# Patient Record
Sex: MALE | Race: OTHER | HISPANIC OR LATINO | URBAN - METROPOLITAN AREA
[De-identification: names, ages, dates, MRNs, and addresses within clinical notes are randomized per-mention and may not be internally consistent; named-entity substitution may affect disease eponyms.]

---

## 2022-12-18 ENCOUNTER — EMERGENCY (EMERGENCY)
Facility: HOSPITAL | Age: 37
LOS: 1 days | Discharge: HOME | End: 2022-12-18
Attending: EMERGENCY MEDICINE | Admitting: EMERGENCY MEDICINE
Payer: SELF-PAY

## 2022-12-18 VITALS
HEART RATE: 83 BPM | RESPIRATION RATE: 20 BRPM | HEIGHT: 68 IN | TEMPERATURE: 98 F | DIASTOLIC BLOOD PRESSURE: 80 MMHG | WEIGHT: 169.98 LBS | OXYGEN SATURATION: 98 % | SYSTOLIC BLOOD PRESSURE: 143 MMHG

## 2022-12-18 PROCEDURE — 65220 REMOVE FOREIGN BODY FROM EYE: CPT | Mod: LT

## 2022-12-18 PROCEDURE — 90715 TDAP VACCINE 7 YRS/> IM: CPT

## 2022-12-18 PROCEDURE — 99284 EMERGENCY DEPT VISIT MOD MDM: CPT

## 2022-12-18 PROCEDURE — 90471 IMMUNIZATION ADMIN: CPT

## 2022-12-18 PROCEDURE — 99284 EMERGENCY DEPT VISIT MOD MDM: CPT | Mod: 25

## 2022-12-18 RX ORDER — IBUPROFEN 200 MG
600 TABLET ORAL ONCE
Refills: 0 | Status: COMPLETED | OUTPATIENT
Start: 2022-12-18 | End: 2022-12-18

## 2022-12-18 RX ORDER — ERYTHROMYCIN BASE 5 MG/GRAM
1 OINTMENT (GRAM) OPHTHALMIC (EYE) ONCE
Refills: 0 | Status: COMPLETED | OUTPATIENT
Start: 2022-12-18 | End: 2022-12-18

## 2022-12-18 RX ORDER — TETANUS TOXOID, REDUCED DIPHTHERIA TOXOID AND ACELLULAR PERTUSSIS VACCINE, ADSORBED 5; 2.5; 8; 8; 2.5 [IU]/.5ML; [IU]/.5ML; UG/.5ML; UG/.5ML; UG/.5ML
0.5 SUSPENSION INTRAMUSCULAR ONCE
Refills: 0 | Status: COMPLETED | OUTPATIENT
Start: 2022-12-18 | End: 2022-12-18

## 2022-12-18 RX ORDER — OFLOXACIN 0.3 %
1 DROPS OPHTHALMIC (EYE) ONCE
Refills: 0 | Status: COMPLETED | OUTPATIENT
Start: 2022-12-18 | End: 2022-12-18

## 2022-12-18 RX ADMIN — Medication 1 APPLICATION(S): at 20:01

## 2022-12-18 RX ADMIN — Medication 600 MILLIGRAM(S): at 19:40

## 2022-12-18 RX ADMIN — TETANUS TOXOID, REDUCED DIPHTHERIA TOXOID AND ACELLULAR PERTUSSIS VACCINE, ADSORBED 0.5 MILLILITER(S): 5; 2.5; 8; 8; 2.5 SUSPENSION INTRAMUSCULAR at 19:40

## 2022-12-18 RX ADMIN — Medication 1 DROP(S): at 20:02

## 2022-12-18 NOTE — ED ADULT NURSE NOTE - OBJECTIVE STATEMENT
37y male w/no pmh presents to ED w/ left eye pain.  [id number] assisted in translation. Pt states he was cleaning metal earlier today and felt something go into his left eye. Pt's left eye is red and slightly swollen with no easily visible foreign body noted. Pt states he has blurry vision. Denies headaches, n/v.

## 2022-12-18 NOTE — ED PROVIDER NOTE - PROGRESS NOTE DETAILS
Ophthal in eye room with pt. Pt's evaluated by ophthal and recommended ofloxacin QID, erythromycin ointment at night, and f/u with clinic on Tuesday.

## 2022-12-18 NOTE — ED PROVIDER NOTE - NSFOLLOWUPCLINICS_GEN_ALL_ED_FT
[facility] Ophthalmology  Ophthalmology  [address] [place] [address], [facility] [address]  [place] [address]  Phone: [phone]  Fax:

## 2022-12-18 NOTE — ED PROVIDER NOTE - NS ED ATTENDING STATEMENT MOD
This was a shared visit with the CHELITA. I reviewed and verified the documentation and independently performed the documented:

## 2022-12-18 NOTE — ED PROVIDER NOTE - PATIENT PORTAL LINK FT
You can access the FollowMyHealth Patient Portal offered by  by registering at the following website: [email or web address]. By joining [facility]’s FollowMyHealth portal, you will also be able to view your health information using other applications (apps) compatible with our system.

## 2022-12-18 NOTE — CONSULT NOTE ADULT - ASSESSMENT
Assessment and Recommendations:  37y male with no past medical history/ocular history consulted for FB sensation in left eye, found to have metallic foreign body and rust ring in the left eye.     #Corneal foreign body and abrasion of L eye  - Metallic foreign body at 9 o'clock removed with 30g needle (after obtaining consent), with 2mm x 1mm corneal abrasion remaining   - Eyelid flipped and fornices swept; no additional FB seen  - DFE with flat macula and periphery, no retinal tear, hole, or detachment noted  - recommend antibiotic eye drop (ofloxacin) four times a day to L eye  - recommend erythromycin ophthalmic ointment nightly to L eye  - Will arrange outpatient ophtho followup      Outpatient Follow-up: Patient should follow-up with his/her ophthalmologist or with [facility] Department of Ophthalmology within 1 week of after discharge at:    [address]  [place] [address]  [phone]    Danuta Miramontes MD, PGY-2  Contact: [employer] Teams     Assessment and Recommendations:  37y male with no past medical history/ocular history consulted for FB sensation in left eye, found to have metallic foreign body and rust ring in the left eye.     #Corneal foreign body and abrasion of L eye  - Metallic foreign body at 9 o'clock removed with 30g needle (after obtaining consent), with 2mm x 1mm corneal abrasion remaining   - Eyelid flipped and fornices swept; no additional FB seen  - DFE with flat macula and periphery, no retinal tear, hole, or detachment noted  - recommend antibiotic eye drop (ofloxacin) four times a day to L eye  - recommend erythromycin ophthalmic ointment nightly to L eye  - Will arrange outpatient ophtho followup      Outpatient Follow-up: Patient should follow-up with his/her ophthalmologist or with [facility] Department of Ophthalmology within 1 week of after discharge at:    [address]  [place] [address]  [phone]    Danuta Miramontes MD, PGY-2  Contact: [employer] Teams     Assessment and Recommendations:  37y male with no past medical history/ocular history consulted for FB sensation in left eye, found to have metallic foreign body and rust ring in the left eye.     #Corneal foreign body and abrasion of L eye  - Metallic foreign body at 9 o'clock removed with 30g needle (after obtaining consent), with 2mm x 1mm corneal abrasion remaining   - Eyelid flipped and fornices swept; no additional FB seen  - DFE with flat macula and periphery, no retinal tear, hole, or detachment noted  - recommend antibiotic eye drop (ofloxacin) four times a day to L eye  - recommend erythromycin ophthalmic ointment nightly to L eye  - Will arrange outpatient ophtho followup      Outpatient Follow-up: Patient should follow-up with his/her ophthalmologist or with [facility] Department of Ophthalmology within 1 week of after discharge at:    [address]  [place] [address]  [phone]    Danuta Miramontes MD, PGY-2  Contact: [employer] Teams

## 2022-12-18 NOTE — CONSULT NOTE ADULT - SUBJECTIVE AND OBJECTIVE BOX
[facility] DEPARTMENT OF OPHTHALMOLOGY - INITIAL ADULT CONSULT  -----------------------------------------------------------------------------  Danuta Miramontes MD, PGY-2  Contact: TEAMS  -----------------------------------------------------------------------------    HPI:  37y M who primarily speaks Portuguese ( ID: [id number]) w/ no pertinent pmhx presents to the ED c/o L eye injury, foreign body sensation x2days and blurry vision starting today. Denies n/v. Reports that he was cleaning metal and felt something go into his L eye. States that he did not go to any doctors after the incident. Denies OTC pain medication use. NKDA. Unsure of last TDAP. Denies wearing contact lenses. Denies headache or N/V. Denies fever, chills, cough or recent sickness.    Interval History: Patient states he got a piece of metal in his left eye on 12/16. He experienced some pain, but thought the metal would fall out and the pain would improve. He presented to [facility] ED on 12/18 because of increasing pain and redness in the left eye. The patient denies any flashes floaters, curtain. Denies any other trauma to the head or eye. States he has never had an eye exam before.     PMH: None   POcHx: denies surg/laser  FH: denies glc/amd  Social History: denies etoh/tobacco  Ophthalmic Medications: none  Allergies: NKDA    Review of Systems:  Constitutional: No fever, chills  Eyes: No blurry vision, flashes, floaters, double vision OU. +FBS, erythema, tearing OS  Neuro: No tremors  Cardiovascular: No chest pain, palpitations  Respiratory: No SOB, no cough  GI: No nausea, vomiting, abdominal pain  : No dysuria  Skin: no rash  Psych: no depression  Endocrine: no polyuria, polydipsia  Heme/lymph: no swelling    VITALS: T(C): 36.6 (12-18-22 @ 17:54)  T(F): 97.8 (12-18-22 @ 17:54), Max: 97.8 (12-18-22 @ 17:54)  HR: 83 (12-18-22 @ 17:54) (83 - 83)  BP: 143/80 (12-18-22 @ 17:54) (143/80 - 143/80)  RR:  (20 - 20)  SpO2:  (98% - 98%)  Wt(kg): --  General: AAO x 3, appropriate mood and affect    Ophthalmology Exam:  Visual acuity (sc): 20/20 OD and OS  Pupils: PERRL OU, no APD  Ttono: 13, 14  Extraocular movements (EOMs): Full OU, no pain, no diplopia  Confrontational Visual Field (CVF): Full OU  Color Plates: 12/12 OD and OS    Slit lamp exam:  External: Flat OU  Lids/Lashes/Lacrimal Ducts: Flat OU    Sclera/Conjunctiva: W+Q OD. 2+ injection OS  Cornea: Cl OD. 2+ SPK OS.   Anterior Chamber: D+F OU. No cell or flare OU.  Iris: Flat OU  Lens: Cl OU    Fundus Exam: dilated with 1% tropicamide and 2.5% phenylephrine  Approval obtained from primary team for dilation  Patient aware that pupils can remained dilated for at least 4-6 hours  Exam performed with 20D lens    Vitreous: wnl OU  Disc, cup/disc: sharp and pink, 0.2 OU  Macula: wnl OU  Vessels: wnl OU  Periphery: wnl OU    Labs/Imaging:  None [facility] DEPARTMENT OF OPHTHALMOLOGY - INITIAL ADULT CONSULT  -----------------------------------------------------------------------------  Danuta Miramontes MD, PGY-2  Contact: TEAMS  -----------------------------------------------------------------------------    HPI:  37y M who primarily speaks Thai ( ID: [id number]) w/ no pertinent pmhx presents to the ED c/o L eye injury, foreign body sensation x2days and blurry vision starting today. Denies n/v. Reports that he was cleaning metal and felt something go into his L eye. States that he did not go to any doctors after the incident. Denies OTC pain medication use. NKDA. Unsure of last TDAP. Denies wearing contact lenses. Denies headache or N/V. Denies fever, chills, cough or recent sickness.    Interval History: Patient states he got a piece of metal in his left eye on 12/16. He experienced some pain, but thought the metal would fall out and the pain would improve. He presented to [facility] ED on 12/18 because of increasing pain and redness in the left eye. The patient denies any flashes floaters, curtain. Denies any other trauma to the head or eye. States he has never had an eye exam before.     PMH: None   POcHx: denies surg/laser  FH: denies glc/amd  Social History: denies etoh/tobacco  Ophthalmic Medications: none  Allergies: NKDA    Review of Systems:  Constitutional: No fever, chills  Eyes: No blurry vision, flashes, floaters, double vision OU. +FBS, erythema, tearing OS  Neuro: No tremors  Cardiovascular: No chest pain, palpitations  Respiratory: No SOB, no cough  GI: No nausea, vomiting, abdominal pain  : No dysuria  Skin: no rash  Psych: no depression  Endocrine: no polyuria, polydipsia  Heme/lymph: no swelling    VITALS: T(C): 36.6 (12-18-22 @ 17:54)  T(F): 97.8 (12-18-22 @ 17:54), Max: 97.8 (12-18-22 @ 17:54)  HR: 83 (12-18-22 @ 17:54) (83 - 83)  BP: 143/80 (12-18-22 @ 17:54) (143/80 - 143/80)  RR:  (20 - 20)  SpO2:  (98% - 98%)  Wt(kg): --  General: AAO x 3, appropriate mood and affect    Ophthalmology Exam:  Visual acuity (sc): 20/20 OD and OS  Pupils: PERRL OU, no APD  Ttono: 13, 14  Extraocular movements (EOMs): Full OU, no pain, no diplopia  Confrontational Visual Field (CVF): Full OU  Color Plates: 12/12 OD and OS    Slit lamp exam:  External: Flat OU  Lids/Lashes/Lacrimal Ducts: Flat OU    Sclera/Conjunctiva: W+Q OD. 2+ injection OS  Cornea: Cl OD. 2+ SPK OS.   Anterior Chamber: D+F OU. No cell or flare OU.  Iris: Flat OU  Lens: Cl OU    Fundus Exam: dilated with 1% tropicamide and 2.5% phenylephrine  Approval obtained from primary team for dilation  Patient aware that pupils can remained dilated for at least 4-6 hours  Exam performed with 20D lens    Vitreous: wnl OU  Disc, cup/disc: sharp and pink, 0.2 OU  Macula: wnl OU  Vessels: wnl OU  Periphery: wnl OU    Labs/Imaging:  None [facility] DEPARTMENT OF OPHTHALMOLOGY - INITIAL ADULT CONSULT  -----------------------------------------------------------------------------  Danuta Miramontes MD, PGY-2  Contact: TEAMS  -----------------------------------------------------------------------------    HPI:  37y M who primarily speaks Solomon Islander ( ID: [id number] and [id number]) w/ no pertinent pmhx presents to the ED c/o L eye injury, foreign body sensation x2days and blurry vision starting today. Denies n/v. Reports that he was cleaning metal and felt something go into his L eye. States that he did not go to any doctors after the incident. Denies OTC pain medication use. NKDA. Unsure of last TDAP. Denies wearing contact lenses. Denies headache or N/V. Denies fever, chills, cough or recent sickness.    Interval History: Patient states he got a piece of metal in his left eye on 12/16. He experienced some pain, but thought the metal would fall out and the pain would improve. He presented to [facility] ED on 12/18 because of increasing pain and redness in the left eye. The patient denies any flashes floaters, curtain. Denies any other trauma to the head or eye. States he has never had an eye exam before.     PMH: None   POcHx: denies surg/laser  FH: denies glc/amd  Social History: denies etoh/tobacco  Ophthalmic Medications: none  Allergies: NKDA    Review of Systems:  Constitutional: No fever, chills  Eyes: No blurry vision, flashes, floaters, double vision OU. +FBS, erythema, tearing OS  Neuro: No tremors  Cardiovascular: No chest pain, palpitations  Respiratory: No SOB, no cough  GI: No nausea, vomiting, abdominal pain  : No dysuria  Skin: no rash  Psych: no depression  Endocrine: no polyuria, polydipsia  Heme/lymph: no swelling    VITALS: T(C): 36.6 (12-18-22 @ 17:54)  T(F): 97.8 (12-18-22 @ 17:54), Max: 97.8 (12-18-22 @ 17:54)  HR: 83 (12-18-22 @ 17:54) (83 - 83)  BP: 143/80 (12-18-22 @ 17:54) (143/80 - 143/80)  RR:  (20 - 20)  SpO2:  (98% - 98%)  Wt(kg): --  General: AAO x 3, appropriate mood and affect    Ophthalmology Exam:  Visual acuity (sc): 20/20 OD and OS  Pupils: PERRL OU, no APD  Ttono: 13, 14  Extraocular movements (EOMs): Full OU, no pain, no diplopia  Confrontational Visual Field (CVF): Full OU  Color Plates: 12/12 OD and OS    Slit lamp exam:  External: Flat OU  Lids/Lashes/Lacrimal Ducts: Flat OU    Sclera/Conjunctiva: W+Q OD. 2+ injection OS  Cornea: Cl OD. 2+ SPK OS.   Anterior Chamber: D+F OU. No cell or flare OU.  Iris: Flat OU  Lens: Cl OU    Fundus Exam: dilated with 1% tropicamide and 2.5% phenylephrine  Approval obtained from primary team for dilation  Patient aware that pupils can remained dilated for at least 4-6 hours  Exam performed with 20D lens    Vitreous: wnl OU  Disc, cup/disc: sharp and pink, 0.2 OU  Macula: wnl OU  Vessels: wnl OU  Periphery: wnl OU    Labs/Imaging:  None

## 2022-12-18 NOTE — ED PROVIDER NOTE - NSFOLLOWUPINSTRUCTIONS_ED_ALL_ED_FT
Please see the information of eye foreign body.    Ofloxacin eye drop, 1 drop, to left eye every 6hours.    Erythromycin ointment at night.    Take Ibuprofen (600mg every 8hours with food) or/and Tylenol (2tablets of 500mg every 8hours) for pain as needed.    Follow up with eye clinic, [phone], call Monday for appointment.    Return for any concerns, fever, worsening pain, swelling, or visual changes.

## 2022-12-18 NOTE — ED PROVIDER NOTE - OBJECTIVE STATEMENT
37y M who primarily speaks Zimbabwean ( ID: [id number] and [id number]) w/ no pertinent pmhx presents to the ED c/o L eye injury, foreign body sensation x2days and blurry vision starting today. Denies n/v. Reports that he was cleaning metal and he has a foreign body sensation in his L eye. States that he did not go to any doctors after the incident. Denies OTC pain medication use. NKDA. Last TDAP was years ago. Denies wearing contact lenses. 37y M who primarily speaks Belgian ( ID: [id number] and [id number]) w/ no pertinent pmhx presents to the ED c/o L eye injury, foreign body sensation x2days and blurry vision starting today. Denies n/v. Reports that he was cleaning metal and felt something go into his L eye. States that he did not go to any doctors after the incident. Denies OTC pain medication use. NKDA. Unsure of last TDAP. Denies wearing contact lenses. 37y M who primarily speaks Saudi Arabian ( ID: [id number] and [id number]) w/ no pertinent pmhx presents to the ED c/o L eye injury, foreign body sensation x2days and blurry vision starting today. Denies n/v. Reports that he was cleaning metal and felt something go into his L eye. States that he did not go to any doctors after the incident. Denies OTC pain medication use. NKDA. Unsure of last TDAP. Denies wearing contact lenses. Denies headache or N/V. Denies fever, chills, cough or recent sickness.

## 2022-12-18 NOTE — ED PROVIDER NOTE - ATTENDING APP SHARED VISIT CONTRIBUTION OF CARE
37-year-old male who is otherwise healthy who presents with left eye injury when he was cleaning metal and felt an object going to the left thigh.  Reports blurring of vision and tearing.  Denies any vision loss, double vision, contact lens use.  Denies any headache, nausea, vomiting, numbness, weakness.  Denies any fevers, chills.  On examination there is uptake with fluorescein to the 9 o'clock position, but negative Umm sign.  There is conjunctival injection to the left eye.  Visual acuity normal range.  Foreign body noted in the 9 o'clock position.    Concern for retained foreign body as well as rust ring.  There is no sign of open globe rupture.  Will consult with ophthalmology.  Will give tetanus and pain medication    Ophthalmologist saw patient, performed's procedure to remove foreign body, recommended to use antibiotic drops with ofloxacin.  This was provided to the patient including instructions for the use.  Repeats examination shows improved symptoms, eye  examination shows mild injection, negative Umm sign, improved visual acuity.  Stable for discharge with close follow-up and strict return precautions. The patient has been informed of all concerning signs and symptoms to return to Emergency Department, the necessity to follow up with ophthalmologist in 1 to 2 days and PMD within 2-3 days was explained, and the patient reports understanding of above with capacity and insight.

## 2022-12-19 ENCOUNTER — NON-APPOINTMENT (OUTPATIENT)
Age: 37
End: 2022-12-19

## 2022-12-19 ENCOUNTER — APPOINTMENT (OUTPATIENT)
Dept: OPHTHALMOLOGY | Facility: CLINIC | Age: 37
End: 2022-12-19

## 2022-12-19 PROBLEM — Z00.00 ENCOUNTER FOR PREVENTIVE HEALTH EXAMINATION: Status: ACTIVE | Noted: 2022-12-19

## 2022-12-19 PROCEDURE — 92002 INTRM OPH EXAM NEW PATIENT: CPT | Mod: 25

## 2022-12-19 PROCEDURE — 65222 REMOVE FOREIGN BODY FROM EYE: CPT | Mod: LT

## 2022-12-22 ENCOUNTER — NON-APPOINTMENT (OUTPATIENT)
Age: 37
End: 2022-12-22

## 2022-12-22 ENCOUNTER — APPOINTMENT (OUTPATIENT)
Dept: OPHTHALMOLOGY | Facility: CLINIC | Age: 37
End: 2022-12-22

## 2022-12-22 PROCEDURE — 92012 INTRM OPH EXAM EST PATIENT: CPT

## 2022-12-29 ENCOUNTER — APPOINTMENT (OUTPATIENT)
Dept: OPHTHALMOLOGY | Facility: CLINIC | Age: 37
End: 2022-12-29
Payer: SELF-PAY

## 2022-12-29 ENCOUNTER — NON-APPOINTMENT (OUTPATIENT)
Age: 37
End: 2022-12-29

## 2022-12-29 PROCEDURE — 92012 INTRM OPH EXAM EST PATIENT: CPT

## 2023-01-12 ENCOUNTER — APPOINTMENT (OUTPATIENT)
Dept: OPHTHALMOLOGY | Facility: CLINIC | Age: 38
End: 2023-01-12

## 2023-02-28 ENCOUNTER — APPOINTMENT (OUTPATIENT)
Dept: OPHTHALMOLOGY | Facility: CLINIC | Age: 38
End: 2023-02-28

## 2024-11-15 NOTE — ED PROVIDER NOTE - MDM PATIENT STATEMENT FOR ADDL TREATMENT
Normal vision: sees adequately in most situations; can see medication labels, newsprint Patient with one or more new problems requiring additional work-up/treatment.